# Patient Record
Sex: FEMALE | Race: BLACK OR AFRICAN AMERICAN | Employment: FULL TIME | URBAN - METROPOLITAN AREA
[De-identification: names, ages, dates, MRNs, and addresses within clinical notes are randomized per-mention and may not be internally consistent; named-entity substitution may affect disease eponyms.]

---

## 2021-06-03 ENCOUNTER — TRANSCRIBE ORDER (OUTPATIENT)
Dept: SCHEDULING | Age: 56
End: 2021-06-03

## 2021-06-03 DIAGNOSIS — R10.2 PERINEAL NEURALGIA: Primary | ICD-10-CM

## 2021-06-04 ENCOUNTER — TRANSCRIBE ORDER (OUTPATIENT)
Dept: SCHEDULING | Age: 56
End: 2021-06-04

## 2021-06-04 DIAGNOSIS — R10.2 PERINEAL NEURALGIA: Primary | ICD-10-CM

## 2021-06-14 ENCOUNTER — HOSPITAL ENCOUNTER (OUTPATIENT)
Dept: ULTRASOUND IMAGING | Age: 56
Discharge: HOME | End: 2021-06-14
Payer: COMMERCIAL

## 2021-06-14 DIAGNOSIS — R10.2 PERINEAL NEURALGIA: ICD-10-CM

## 2021-06-14 PROCEDURE — 76830 TRANSVAGINAL US NON-OB: CPT

## 2021-06-14 PROCEDURE — 76856 US EXAM PELVIC COMPLETE: CPT

## 2021-11-05 ENCOUNTER — HOSPITAL ENCOUNTER (EMERGENCY)
Age: 56
Discharge: HOME | End: 2021-11-05
Attending: EMERGENCY MEDICINE
Payer: COMMERCIAL

## 2021-11-05 VITALS
DIASTOLIC BLOOD PRESSURE: 103 MMHG | HEART RATE: 70 BPM | TEMPERATURE: 97.3 F | RESPIRATION RATE: 18 BRPM | OXYGEN SATURATION: 100 % | SYSTOLIC BLOOD PRESSURE: 211 MMHG

## 2021-11-05 DIAGNOSIS — K08.89 TOOTHACHE: Primary | ICD-10-CM

## 2021-11-05 PROCEDURE — 74011000250 HC RX REV CODE- 250: Performed by: EMERGENCY MEDICINE

## 2021-11-05 PROCEDURE — 99282 EMERGENCY DEPT VISIT SF MDM: CPT

## 2021-11-05 PROCEDURE — 74011250637 HC RX REV CODE- 250/637: Performed by: EMERGENCY MEDICINE

## 2021-11-05 RX ORDER — PENICILLIN V POTASSIUM 500 MG/1
500 TABLET, FILM COATED ORAL 4 TIMES DAILY
Qty: 28 TABLET | Refills: 0 | Status: SHIPPED | OUTPATIENT
Start: 2021-11-05 | End: 2021-11-12

## 2021-11-05 RX ADMIN — BENZOCAINE: 200 SPRAY DENTAL; ORAL; PERIODONTAL at 04:50

## 2021-11-05 NOTE — ED TRIAGE NOTES
Pt arrives ambulatory with CC of dental pain on the right lower side of her mouth that extends down her neck, states it began a few days ago and the dentist is unable to get her in for an appointment for several days.

## 2021-11-05 NOTE — ED PROVIDER NOTES
Healthy. She presents with complaints of right lower molar pain. The pain began yesterday approximately 16 hours ago. It has been constant. She can still eat and drink but it is painful to chew on the right side. No limitation of jaw opening. She is having pain under her right mandible. No fever. No significant swelling. Symptoms are moderate. She has tried [place] plus with some relief. No past medical history on file. No past surgical history on file. No family history on file. Social History     Socioeconomic History    Marital status: SINGLE     Spouse name: Not on file    Number of children: Not on file    Years of education: Not on file    Highest education level: Not on file   Occupational History    Not on file   Tobacco Use    Smoking status: Not on file    Smokeless tobacco: Not on file   Substance and Sexual Activity    Alcohol use: Not on file    Drug use: Not on file    Sexual activity: Not on file   Other Topics Concern    Not on file   Social History Narrative    Not on file     Social Determinants of Health     Financial Resource Strain:     Difficulty of Paying Living Expenses: Not on file   Food Insecurity:     Worried About Running Out of Food in the Last Year: Not on file    [place] of Food in the Last Year: Not on file   Transportation Needs:     Lack of Transportation (Medical): Not on file    Lack of Transportation (Non-Medical):  Not on file   Physical Activity:     Days of Exercise per Week: Not on file    Minutes of Exercise per Session: Not on file   Stress:     Feeling of Stress : Not on file   Social Connections:     Frequency of Communication with Friends and Family: Not on file    Frequency of Social Gatherings with Friends and Family: Not on file    Attends Catholic Services: Not on file    Active Member of Clubs or Organizations: Not on file    Attends Club or Organization Meetings: Not on file    Marital Status: Not on file   Intimate Partner Violence:     Fear of Current or Ex-Partner: Not on file    Emotionally Abused: Not on file    Physically Abused: Not on file    Sexually Abused: Not on file   Housing Stability:     Unable to Pay for Housing in the Last Year: Not on file    Number of [place] in the Last Year: Not on file    Unstable Housing in the Last Year: Not on file         ALLERGIES: Patient has no known allergies. Review of Systems   All other systems reviewed and are negative. Vitals:    11/05/21 0420   BP: (!) 211/103   Pulse: 70   Resp: 18   Temp: 97.3 °F (36.3 °C)   SpO2: 100%            Physical Exam  Vitals and nursing note reviewed. Constitutional:       Appearance: She is well-developed. HENT:      Head: Normocephalic and atraumatic. Mouth/Throat:      Comments: Tender right lower molar with erosion of the tooth noted. No abscess noted. No trismus. Eyes:      Conjunctiva/sclera: Conjunctivae normal.   Neck:      Trachea: No tracheal deviation. Comments: She has some anterior neck tenderness under her right mandible. No significant swelling. Cardiovascular:      Rate and Rhythm: Normal rate. Pulmonary:      Effort: Pulmonary effort is normal.   Abdominal:      General: There is no distension. Skin:     General: Skin is dry. Neurological:      Mental Status: She is alert. MDM       Procedures    Assessment/plan: Right lower molar tooth ache -no abscess noted. Nontoxic appearance. No trismus or voice changes. Dental ball in the ED and penicillin for home. Close dental follow-up. Return precautions discussed.   Godfrey Jesus MD  4:35 AM

## 2022-03-03 ENCOUNTER — TRANSCRIBE ORDER (OUTPATIENT)
Dept: SCHEDULING | Age: 57
End: 2022-03-03

## 2022-03-03 DIAGNOSIS — Z12.31 SCREENING MAMMOGRAM, ENCOUNTER FOR: Primary | ICD-10-CM

## 2022-03-18 LAB
CREATININE, EXTERNAL: 0.87
LDL-C, EXTERNAL: 91
TOTAL CHOLESTEROL, NCHOLT: 163

## 2022-10-17 RX ORDER — BISOPROLOL FUMARATE AND HYDROCHLOROTHIAZIDE 5; 6.25 MG/1; MG/1
1 TABLET ORAL DAILY
Qty: 30 TABLET | Refills: 0 | Status: SHIPPED | OUTPATIENT
Start: 2022-10-17 | End: 2022-12-05 | Stop reason: SDUPTHER

## 2022-10-17 NOTE — TELEPHONE ENCOUNTER
Patient called stating she needs a refill of Bisoprolol Hydroch 5-6.25mg 1 daily to [facility] mail order

## 2022-11-25 DIAGNOSIS — I10 ESSENTIAL HYPERTENSION: Primary | ICD-10-CM

## 2022-11-25 DIAGNOSIS — E78.2 MIXED HYPERLIPIDEMIA: ICD-10-CM

## 2022-12-05 RX ORDER — BISOPROLOL FUMARATE AND HYDROCHLOROTHIAZIDE 5; 6.25 MG/1; MG/1
1 TABLET ORAL DAILY
Qty: 30 TABLET | Refills: 0 | Status: SHIPPED | OUTPATIENT
Start: 2022-12-05

## 2022-12-05 NOTE — TELEPHONE ENCOUNTER
Patient needs refill on Bisoprolol Hydrochlorothiazide.    [id number] [facility] ([place], [place])

## 2022-12-28 PROBLEM — E78.00 HYPERCHOLESTEROLEMIA: Status: ACTIVE | Noted: 2022-12-28

## 2022-12-28 PROBLEM — I10 BENIGN DIASTOLIC HYPERTENSION: Status: ACTIVE | Noted: 2022-12-28

## 2022-12-28 PROBLEM — E03.8 SUBCLINICAL HYPOTHYROIDISM: Status: ACTIVE | Noted: 2022-12-28

## 2022-12-28 PROBLEM — E55.9 VITAMIN D DEFICIENCY: Status: ACTIVE | Noted: 2022-12-28

## 2022-12-28 PROBLEM — D25.9 UTERINE LEIOMYOMA: Status: ACTIVE | Noted: 2022-12-28

## 2023-02-03 ENCOUNTER — TRANSCRIBE ORDER (OUTPATIENT)
Dept: SCHEDULING | Age: 58
End: 2023-02-03

## 2023-02-03 DIAGNOSIS — Z12.31 ENCOUNTER FOR SCREENING MAMMOGRAM FOR MALIGNANT NEOPLASM OF BREAST: Primary | ICD-10-CM

## 2023-04-22 DIAGNOSIS — Z12.31 ENCOUNTER FOR SCREENING MAMMOGRAM FOR MALIGNANT NEOPLASM OF BREAST: Primary | ICD-10-CM

## 2023-12-22 ENCOUNTER — HOSPITAL ENCOUNTER (OUTPATIENT)
Facility: HOSPITAL | Age: 58
Setting detail: OBSERVATION
Discharge: HOME | End: 2023-12-24
Attending: EMERGENCY MEDICINE | Admitting: HOSPITALIST
Payer: MEDICAID

## 2023-12-22 ENCOUNTER — APPOINTMENT (OUTPATIENT)
Facility: HOSPITAL | Age: 58
End: 2023-12-22
Payer: MEDICAID

## 2023-12-22 DIAGNOSIS — R07.89 OTHER CHEST PAIN: ICD-10-CM

## 2023-12-22 DIAGNOSIS — R07.9 CHEST PAIN, UNSPECIFIED TYPE: Primary | ICD-10-CM

## 2023-12-22 LAB
ALBUMIN SERPL-MCNC: 3.9 G/DL (ref 3.5–5)
ALBUMIN/GLOB SERPL: 0.9 (ref 1.1–2.2)
ALP SERPL-CCNC: 107 U/L (ref 45–117)
ALT SERPL-CCNC: 16 U/L (ref 12–78)
ANION GAP SERPL CALC-SCNC: 5 MMOL/L (ref 5–15)
AST SERPL W P-5'-P-CCNC: 13 U/L (ref 15–37)
BASOPHILS # BLD: 0 K/UL (ref 0–0.1)
BASOPHILS NFR BLD: 0 % (ref 0–1)
BILIRUB SERPL-MCNC: 0.7 MG/DL (ref 0.2–1)
BNP SERPL-MCNC: 104 PG/ML
BUN SERPL-MCNC: 9 MG/DL (ref 6–20)
BUN/CREAT SERPL: 11 (ref 12–20)
CA-I BLD-MCNC: 9.5 MG/DL (ref 8.5–10.1)
CHLORIDE SERPL-SCNC: 108 MMOL/L (ref 97–108)
CO2 SERPL-SCNC: 28 MMOL/L (ref 21–32)
CREAT SERPL-MCNC: 0.81 MG/DL (ref 0.55–1.02)
D DIMER PPP FEU-MCNC: 0.57 UG/ML(FEU)
DIFFERENTIAL METHOD BLD: NORMAL
EOSINOPHIL # BLD: 0.1 K/UL (ref 0–0.4)
EOSINOPHIL NFR BLD: 1 % (ref 0–7)
ERYTHROCYTE [DISTWIDTH] IN BLOOD BY AUTOMATED COUNT: 13.5 % (ref 11.5–14.5)
GLOBULIN SER CALC-MCNC: 4.5 G/DL (ref 2–4)
GLUCOSE SERPL-MCNC: 100 MG/DL (ref 65–100)
HCT VFR BLD AUTO: 37.3 % (ref 35–47)
HGB BLD-MCNC: 11.9 G/DL (ref 11.5–16)
IMM GRANULOCYTES # BLD AUTO: 0 K/UL (ref 0–0.04)
IMM GRANULOCYTES NFR BLD AUTO: 0 % (ref 0–0.5)
LIPASE SERPL-CCNC: 54 U/L (ref 13–75)
LYMPHOCYTES # BLD: 1.8 K/UL (ref 0.8–3.5)
LYMPHOCYTES NFR BLD: 34 % (ref 12–49)
MAGNESIUM SERPL-MCNC: 2.3 MG/DL (ref 1.6–2.4)
MCH RBC QN AUTO: 26.2 PG (ref 26–34)
MCHC RBC AUTO-ENTMCNC: 31.9 G/DL (ref 30–36.5)
MCV RBC AUTO: 82.2 FL (ref 80–99)
MONOCYTES # BLD: 0.3 K/UL (ref 0–1)
MONOCYTES NFR BLD: 6 % (ref 5–13)
NEUTS SEG # BLD: 3.1 K/UL (ref 1.8–8)
NEUTS SEG NFR BLD: 59 % (ref 32–75)
NRBC # BLD: 0 K/UL (ref 0–0.01)
NRBC BLD-RTO: 0 PER 100 WBC
PLATELET # BLD AUTO: 301 K/UL (ref 150–400)
PMV BLD AUTO: 11 FL (ref 8.9–12.9)
POTASSIUM SERPL-SCNC: 3.4 MMOL/L (ref 3.5–5.1)
PROT SERPL-MCNC: 8.4 G/DL (ref 6.4–8.2)
RBC # BLD AUTO: 4.54 M/UL (ref 3.8–5.2)
SODIUM SERPL-SCNC: 141 MMOL/L (ref 136–145)
TROPONIN I SERPL HS-MCNC: 7 NG/L (ref 0–51)
TROPONIN I SERPL HS-MCNC: 7 NG/L (ref 0–51)
WBC # BLD AUTO: 5.3 K/UL (ref 3.6–11)

## 2023-12-22 PROCEDURE — 71275 CT ANGIOGRAPHY CHEST: CPT

## 2023-12-22 PROCEDURE — 85025 COMPLETE CBC W/AUTO DIFF WBC: CPT

## 2023-12-22 PROCEDURE — 83735 ASSAY OF MAGNESIUM: CPT

## 2023-12-22 PROCEDURE — 36415 COLL VENOUS BLD VENIPUNCTURE: CPT

## 2023-12-22 PROCEDURE — 6360000004 HC RX CONTRAST MEDICATION: Performed by: EMERGENCY MEDICINE

## 2023-12-22 PROCEDURE — 80053 COMPREHEN METABOLIC PANEL: CPT

## 2023-12-22 PROCEDURE — 94761 N-INVAS EAR/PLS OXIMETRY MLT: CPT

## 2023-12-22 PROCEDURE — 93005 ELECTROCARDIOGRAM TRACING: CPT | Performed by: EMERGENCY MEDICINE

## 2023-12-22 PROCEDURE — 85379 FIBRIN DEGRADATION QUANT: CPT

## 2023-12-22 PROCEDURE — 99285 EMERGENCY DEPT VISIT HI MDM: CPT

## 2023-12-22 PROCEDURE — 83880 ASSAY OF NATRIURETIC PEPTIDE: CPT

## 2023-12-22 PROCEDURE — 71045 X-RAY EXAM CHEST 1 VIEW: CPT

## 2023-12-22 PROCEDURE — 84484 ASSAY OF TROPONIN QUANT: CPT

## 2023-12-22 PROCEDURE — 83690 ASSAY OF LIPASE: CPT

## 2023-12-22 RX ADMIN — IOPAMIDOL 100 ML: 755 INJECTION, SOLUTION INTRAVENOUS at 23:23

## 2023-12-23 LAB
ANION GAP SERPL CALC-SCNC: 5 MMOL/L (ref 5–15)
BUN SERPL-MCNC: 8 MG/DL (ref 6–20)
BUN/CREAT SERPL: 9 (ref 12–20)
CA-I BLD-MCNC: 9 MG/DL (ref 8.5–10.1)
CHLORIDE SERPL-SCNC: 107 MMOL/L (ref 97–108)
CHOLEST SERPL-MCNC: 244 MG/DL
CO2 SERPL-SCNC: 29 MMOL/L (ref 21–32)
CREAT SERPL-MCNC: 0.87 MG/DL (ref 0.55–1.02)
ERYTHROCYTE [DISTWIDTH] IN BLOOD BY AUTOMATED COUNT: 13.4 % (ref 11.5–14.5)
GLUCOSE SERPL-MCNC: 116 MG/DL (ref 65–100)
HCT VFR BLD AUTO: 37.2 % (ref 35–47)
HDLC SERPL-MCNC: 48 MG/DL
HDLC SERPL: 5.1 (ref 0–5)
HGB BLD-MCNC: 11.7 G/DL (ref 11.5–16)
LDLC SERPL CALC-MCNC: 170.6 MG/DL (ref 0–100)
LIPID PANEL: ABNORMAL
MCH RBC QN AUTO: 25.9 PG (ref 26–34)
MCHC RBC AUTO-ENTMCNC: 31.5 G/DL (ref 30–36.5)
MCV RBC AUTO: 82.3 FL (ref 80–99)
NRBC # BLD: 0 K/UL (ref 0–0.01)
NRBC BLD-RTO: 0 PER 100 WBC
PLATELET # BLD AUTO: 257 K/UL (ref 150–400)
PMV BLD AUTO: 10.6 FL (ref 8.9–12.9)
POTASSIUM SERPL-SCNC: 3.5 MMOL/L (ref 3.5–5.1)
RBC # BLD AUTO: 4.52 M/UL (ref 3.8–5.2)
SODIUM SERPL-SCNC: 141 MMOL/L (ref 136–145)
TRIGL SERPL-MCNC: 127 MG/DL
TROPONIN I SERPL HS-MCNC: 6 NG/L (ref 0–51)
VLDLC SERPL CALC-MCNC: 25.4 MG/DL
WBC # BLD AUTO: 5.2 K/UL (ref 3.6–11)

## 2023-12-23 PROCEDURE — 99254 IP/OBS CNSLTJ NEW/EST MOD 60: CPT | Performed by: SURGERY

## 2023-12-23 PROCEDURE — 2580000003 HC RX 258: Performed by: HOSPITALIST

## 2023-12-23 PROCEDURE — 6360000002 HC RX W HCPCS: Performed by: HOSPITALIST

## 2023-12-23 PROCEDURE — 6370000000 HC RX 637 (ALT 250 FOR IP): Performed by: STUDENT IN AN ORGANIZED HEALTH CARE EDUCATION/TRAINING PROGRAM

## 2023-12-23 PROCEDURE — 94761 N-INVAS EAR/PLS OXIMETRY MLT: CPT

## 2023-12-23 PROCEDURE — 80061 LIPID PANEL: CPT

## 2023-12-23 PROCEDURE — 80048 BASIC METABOLIC PNL TOTAL CA: CPT

## 2023-12-23 PROCEDURE — 36415 COLL VENOUS BLD VENIPUNCTURE: CPT

## 2023-12-23 PROCEDURE — 2580000003 HC RX 258: Performed by: STUDENT IN AN ORGANIZED HEALTH CARE EDUCATION/TRAINING PROGRAM

## 2023-12-23 PROCEDURE — G0378 HOSPITAL OBSERVATION PER HR: HCPCS

## 2023-12-23 PROCEDURE — 84484 ASSAY OF TROPONIN QUANT: CPT

## 2023-12-23 PROCEDURE — 6370000000 HC RX 637 (ALT 250 FOR IP): Performed by: HOSPITALIST

## 2023-12-23 PROCEDURE — 85027 COMPLETE CBC AUTOMATED: CPT

## 2023-12-23 RX ORDER — ONDANSETRON 4 MG/1
4 TABLET, ORALLY DISINTEGRATING ORAL EVERY 8 HOURS PRN
Status: DISCONTINUED | OUTPATIENT
Start: 2023-12-23 | End: 2023-12-24 | Stop reason: HOSPADM

## 2023-12-23 RX ORDER — LOPERAMIDE HYDROCHLORIDE 2 MG/1
2 CAPSULE ORAL 4 TIMES DAILY PRN
Status: DISCONTINUED | OUTPATIENT
Start: 2023-12-23 | End: 2023-12-24 | Stop reason: HOSPADM

## 2023-12-23 RX ORDER — SODIUM CHLORIDE 9 MG/ML
INJECTION, SOLUTION INTRAVENOUS CONTINUOUS
Status: DISPENSED | OUTPATIENT
Start: 2023-12-23 | End: 2023-12-24

## 2023-12-23 RX ORDER — ONDANSETRON 2 MG/ML
4 INJECTION INTRAMUSCULAR; INTRAVENOUS EVERY 6 HOURS PRN
Status: DISCONTINUED | OUTPATIENT
Start: 2023-12-23 | End: 2023-12-24 | Stop reason: HOSPADM

## 2023-12-23 RX ORDER — SODIUM CHLORIDE 9 MG/ML
INJECTION, SOLUTION INTRAVENOUS PRN
Status: DISCONTINUED | OUTPATIENT
Start: 2023-12-23 | End: 2023-12-24 | Stop reason: HOSPADM

## 2023-12-23 RX ORDER — LANOLIN ALCOHOL/MO/W.PET/CERES
1000 CREAM (GRAM) TOPICAL DAILY
Status: DISCONTINUED | OUTPATIENT
Start: 2023-12-23 | End: 2023-12-24 | Stop reason: HOSPADM

## 2023-12-23 RX ORDER — SODIUM CHLORIDE 0.9 % (FLUSH) 0.9 %
5-40 SYRINGE (ML) INJECTION PRN
Status: DISCONTINUED | OUTPATIENT
Start: 2023-12-23 | End: 2023-12-24 | Stop reason: HOSPADM

## 2023-12-23 RX ORDER — ACETAMINOPHEN 650 MG/1
650 SUPPOSITORY RECTAL EVERY 6 HOURS PRN
Status: DISCONTINUED | OUTPATIENT
Start: 2023-12-23 | End: 2023-12-24 | Stop reason: HOSPADM

## 2023-12-23 RX ORDER — ENOXAPARIN SODIUM 100 MG/ML
40 INJECTION SUBCUTANEOUS DAILY
Status: DISCONTINUED | OUTPATIENT
Start: 2023-12-23 | End: 2023-12-24 | Stop reason: HOSPADM

## 2023-12-23 RX ORDER — SODIUM CHLORIDE 0.9 % (FLUSH) 0.9 %
5-40 SYRINGE (ML) INJECTION EVERY 12 HOURS SCHEDULED
Status: DISCONTINUED | OUTPATIENT
Start: 2023-12-23 | End: 2023-12-24 | Stop reason: HOSPADM

## 2023-12-23 RX ORDER — BISOPROLOL FUMARATE AND HYDROCHLOROTHIAZIDE 5; 6.25 MG/1; MG/1
1 TABLET ORAL DAILY
Status: DISCONTINUED | OUTPATIENT
Start: 2023-12-23 | End: 2023-12-23

## 2023-12-23 RX ORDER — ASPIRIN 81 MG/1
81 TABLET, CHEWABLE ORAL DAILY
Status: DISCONTINUED | OUTPATIENT
Start: 2023-12-23 | End: 2023-12-24 | Stop reason: HOSPADM

## 2023-12-23 RX ORDER — ATORVASTATIN CALCIUM 10 MG/1
10 TABLET, FILM COATED ORAL DAILY
Status: DISCONTINUED | OUTPATIENT
Start: 2023-12-23 | End: 2023-12-24

## 2023-12-23 RX ORDER — ACETAMINOPHEN 325 MG/1
650 TABLET ORAL EVERY 6 HOURS PRN
Status: DISCONTINUED | OUTPATIENT
Start: 2023-12-23 | End: 2023-12-24 | Stop reason: HOSPADM

## 2023-12-23 RX ADMIN — SODIUM CHLORIDE, PRESERVATIVE FREE 10 ML: 5 INJECTION INTRAVENOUS at 21:36

## 2023-12-23 RX ADMIN — SODIUM CHLORIDE, PRESERVATIVE FREE 10 ML: 5 INJECTION INTRAVENOUS at 08:56

## 2023-12-23 RX ADMIN — LOPERAMIDE HYDROCHLORIDE 2 MG: 2 CAPSULE ORAL at 15:43

## 2023-12-23 RX ADMIN — SODIUM CHLORIDE: 9 INJECTION, SOLUTION INTRAVENOUS at 15:43

## 2023-12-23 RX ADMIN — SODIUM CHLORIDE, PRESERVATIVE FREE 10 ML: 5 INJECTION INTRAVENOUS at 09:28

## 2023-12-23 NOTE — ED NOTES
Patient is being transferred to [address] [place] [address] for further evaluation and treatment. Tele box placed on patient. Sbar completed on [facility]. Patient left on wheelchair stable.

## 2023-12-23 NOTE — ED NOTES
ED TO INPATIENT SBAR HANDOFF    Patient Name: Peggy Ty   Preferred Name: Crescencio Quiroz  : 1965  60 y.o. Family/Caregiver Present: no   Code Status Order: Full Code  PO Status: No  Telemetry Order: Yes  C-SSRS: Risk of Suicide: No Risk  Sitter no   Restraints:     Sepsis Risk Score Sepsis Risk Score: 0.54    Situation  Chief Complaint   Patient presents with    Chest Pain     Brief Description of Patient's Condition:   Mental Status: oriented, alert, coherent, and logical  Arrived from:Home  Imaging:   CTA CHEST W WO CONTRAST   Final Result   1. No evidence of pulmonary embolism. 2.  No other acute abnormalities. 3.  Large cyst in the spleen. XR CHEST PORTABLE   Final Result   No acute cardiopulmonary abnormalities.            Abnormal labs:   Abnormal Labs Reviewed   COMPREHENSIVE METABOLIC PANEL - Abnormal; Notable for the following components:       Result Value    Potassium 3.4 (*)     Bun/Cre Ratio 11 (*)     AST 13 (*)     Total Protein 8.4 (*)     Globulin 4.5 (*)     Albumin/Globulin Ratio 0.9 (*)     All other components within normal limits   D-DIMER, QUANTITATIVE - Abnormal; Notable for the following components:    D-Dimer, Quant 0.57 (*)     All other components within normal limits   BASIC METABOLIC PANEL W/ REFLEX TO MG FOR LOW K - Abnormal; Notable for the following components:    Glucose 116 (*)     Bun/Cre Ratio 9 (*)     All other components within normal limits   CBC - Abnormal; Notable for the following components:    MCH 25.9 (*)     All other components within normal limits       Background  Allergies: No Known Allergies  History:   Past Medical History:   Diagnosis Date    Benign diastolic hypertension     Hypercholesterolemia     Subclinical hypothyroidism     Uterine leiomyoma     Vitamin D deficiency        Assessment  Vitals:    Level of Consciousness: Alert (0)   Vitals:    23 0330 23 0415 23 0737 23 1151   BP: (!) 146/89 134/78 132/74

## 2023-12-23 NOTE — ED PROVIDER NOTES
11.9 11.5 - 16.0 g/dL    Hematocrit 37.3 35.0 - 47.0 %    MCV 82.2 80.0 - 99.0 FL    MCH 26.2 26.0 - 34.0 PG    MCHC 31.9 30.0 - 36.5 g/dL    RDW 13.5 11.5 - 14.5 %    Platelets 294 355 - 798 K/uL    MPV 11.0 8.9 - 12.9 FL    Nucleated RBCs 0.0 0.0  WBC    nRBC 0.00 0.00 - 0.01 K/uL    Neutrophils % 59 32 - 75 %    Lymphocytes % 34 12 - 49 %    Monocytes % 6 5 - 13 %    Eosinophils % 1 0 - 7 %    Basophils % 0 0 - 1 %    Immature Granulocytes 0 0 - 0.5 %    Neutrophils Absolute 3.1 1.8 - 8.0 K/UL    Lymphocytes Absolute 1.8 0.8 - 3.5 K/UL    Monocytes Absolute 0.3 0.0 - 1.0 K/UL    Eosinophils Absolute 0.1 0.0 - 0.4 K/UL    Basophils Absolute 0.0 0.0 - 0.1 K/UL    Absolute Immature Granulocyte 0.0 0.00 - 0.04 K/UL    Differential Type AUTOMATED     Comprehensive Metabolic Panel    Collection Time: 12/22/23  9:51 PM   Result Value Ref Range    Sodium 141 136 - 145 mmol/L    Potassium 3.4 (L) 3.5 - 5.1 mmol/L    Chloride 108 97 - 108 mmol/L    CO2 28 21 - 32 mmol/L    Anion Gap 5 5 - 15 mmol/L    Glucose 100 65 - 100 mg/dL    BUN 9 6 - 20 mg/dL    Creatinine 0.81 0.55 - 1.02 mg/dL    Bun/Cre Ratio 11 (L) 12 - 20      Est, Glom Filt Rate >60 >60 ml/min/1.73m2    Calcium 9.5 8.5 - 10.1 mg/dL    Total Bilirubin 0.7 0.2 - 1.0 mg/dL    AST 13 (L) 15 - 37 U/L    ALT 16 12 - 78 U/L    Alk Phosphatase 107 45 - 117 U/L    Total Protein 8.4 (H) 6.4 - 8.2 g/dL    Albumin 3.9 3.5 - 5.0 g/dL    Globulin 4.5 (H) 2.0 - 4.0 g/dL    Albumin/Globulin Ratio 0.9 (L) 1.1 - 2.2     Troponin    Collection Time: 12/22/23  9:51 PM   Result Value Ref Range    Troponin, High Sensitivity 7 0 - 51 ng/L   Lipase    Collection Time: 12/22/23  9:51 PM   Result Value Ref Range    Lipase 54 13 - 75 U/L   Brain Natriuretic Peptide    Collection Time: 12/22/23  9:51 PM   Result Value Ref Range    NT Pro- <125 pg/mL   D-Dimer, Quantitative    Collection Time: 12/22/23  9:51 PM   Result Value Ref Range    D-Dimer, Quant 0.57 (H) <0.50

## 2023-12-23 NOTE — H&P
High Sensitivity 7 0 - 51 ng/L   Lipase    Collection Time: 12/22/23  9:51 PM   Result Value Ref Range    Lipase 54 13 - 75 U/L   Brain Natriuretic Peptide    Collection Time: 12/22/23  9:51 PM   Result Value Ref Range    NT Pro- <125 pg/mL   D-Dimer, Quantitative    Collection Time: 12/22/23  9:51 PM   Result Value Ref Range    D-Dimer, Quant 0.57 (H) <0.50 ug/ml(FEU)   Magnesium    Collection Time: 12/22/23  9:51 PM   Result Value Ref Range    Magnesium 2.3 1.6 - 2.4 mg/dL   Troponin    Collection Time: 12/22/23 11:03 PM   Result Value Ref Range    Troponin, High Sensitivity 7 0 - 51 ng/L       Radiologic Studies :   Imaging:   CTA CHEST W WO CONTRAST   Final Result   1. No evidence of pulmonary embolism. 2.  No other acute abnormalities. 3.  Large cyst in the spleen. XR CHEST PORTABLE   Final Result   No acute cardiopulmonary abnormalities. Assessment and Plan :     Chest pain: Has family history. No other risk factors. Initial enzymes negative, will repeat in the morning, echocardiogram and cardiology consultation. Patient is explained about this and she understands. Medications Home :    Reviewed    Code status : Full code    VTE prophylaxis :  Enoxaparin    Advance Medical directive : Health care decision maker information is NOT on file. Discussion/MDM:   I have discussed patient's presentation/findings and clinical course to date with ED provider. Given the patient's current clinical presentation, I have a high level of concern for decompensation if discharged from the emergency department as patient has multiple medical comorbidities with increased risk of morbidity and mortality  that warrants admission to hospital.     I have reviewed patient's presenting subjective and objective findings, as well as all laboratory studies, imaging studies, and vital signs to date as well as treatment rendered and patient's response to those treatments.   In

## 2023-12-23 NOTE — ED NOTES
Patient refused all meds due this morning stating that she does not takes any medications at home and she does not wants to take any here. Provider (Kemi Watkins) came in the room and was made aware of this. Patient denies any pain or discomfort. VSS. WCTM.

## 2023-12-24 VITALS
OXYGEN SATURATION: 100 % | BODY MASS INDEX: 24.41 KG/M2 | RESPIRATION RATE: 18 BRPM | WEIGHT: 143 LBS | TEMPERATURE: 98.2 F | HEIGHT: 64 IN | SYSTOLIC BLOOD PRESSURE: 165 MMHG | HEART RATE: 71 BPM | DIASTOLIC BLOOD PRESSURE: 97 MMHG

## 2023-12-24 PROBLEM — E78.00 HYPERCHOLESTEROLEMIA: Status: ACTIVE | Noted: 2022-12-28

## 2023-12-24 LAB
EKG ATRIAL RATE: 91 BPM
EKG DIAGNOSIS: NORMAL
EKG P AXIS: 70 DEGREES
EKG P-R INTERVAL: 184 MS
EKG Q-T INTERVAL: 384 MS
EKG QRS DURATION: 80 MS
EKG QTC CALCULATION (BAZETT): 472 MS
EKG R AXIS: 8 DEGREES
EKG T AXIS: 50 DEGREES
EKG VENTRICULAR RATE: 91 BPM

## 2023-12-24 PROCEDURE — 87506 IADNA-DNA/RNA PROBE TQ 6-11: CPT

## 2023-12-24 PROCEDURE — 2580000003 HC RX 258: Performed by: STUDENT IN AN ORGANIZED HEALTH CARE EDUCATION/TRAINING PROGRAM

## 2023-12-24 PROCEDURE — 2580000003 HC RX 258: Performed by: HOSPITALIST

## 2023-12-24 PROCEDURE — 87177 OVA AND PARASITES SMEARS: CPT

## 2023-12-24 PROCEDURE — 6370000000 HC RX 637 (ALT 250 FOR IP): Performed by: STUDENT IN AN ORGANIZED HEALTH CARE EDUCATION/TRAINING PROGRAM

## 2023-12-24 PROCEDURE — 87209 SMEAR COMPLEX STAIN: CPT

## 2023-12-24 PROCEDURE — G0378 HOSPITAL OBSERVATION PER HR: HCPCS

## 2023-12-24 PROCEDURE — 87329 GIARDIA AG IA: CPT

## 2023-12-24 RX ORDER — PANTOPRAZOLE SODIUM 40 MG/1
40 TABLET, DELAYED RELEASE ORAL
Status: DISCONTINUED | OUTPATIENT
Start: 2023-12-25 | End: 2023-12-24 | Stop reason: HOSPADM

## 2023-12-24 RX ORDER — ATORVASTATIN CALCIUM 20 MG/1
20 TABLET, FILM COATED ORAL DAILY
Qty: 30 TABLET | Refills: 3 | Status: SHIPPED | OUTPATIENT
Start: 2023-12-25

## 2023-12-24 RX ORDER — ASPIRIN 81 MG/1
81 TABLET, CHEWABLE ORAL DAILY
Qty: 30 TABLET | Refills: 3 | Status: SHIPPED | OUTPATIENT
Start: 2023-12-25

## 2023-12-24 RX ORDER — SODIUM CHLORIDE 9 MG/ML
INJECTION, SOLUTION INTRAVENOUS CONTINUOUS
Status: DISCONTINUED | OUTPATIENT
Start: 2023-12-24 | End: 2023-12-24 | Stop reason: HOSPADM

## 2023-12-24 RX ORDER — PANTOPRAZOLE SODIUM 40 MG/1
40 TABLET, DELAYED RELEASE ORAL
Qty: 30 TABLET | Refills: 3 | Status: SHIPPED | OUTPATIENT
Start: 2023-12-25

## 2023-12-24 RX ORDER — ATORVASTATIN CALCIUM 20 MG/1
20 TABLET, FILM COATED ORAL DAILY
Status: DISCONTINUED | OUTPATIENT
Start: 2023-12-24 | End: 2023-12-24 | Stop reason: HOSPADM

## 2023-12-24 RX ADMIN — SODIUM CHLORIDE, PRESERVATIVE FREE 10 ML: 5 INJECTION INTRAVENOUS at 09:31

## 2023-12-24 RX ADMIN — ATORVASTATIN CALCIUM 20 MG: 20 TABLET, FILM COATED ORAL at 12:10

## 2023-12-24 RX ADMIN — SODIUM CHLORIDE: 9 INJECTION, SOLUTION INTRAVENOUS at 14:24

## 2023-12-24 NOTE — CARE COORDINATION
12/24/23 1026   Service Assessment   Patient Orientation Alert and Oriented   Cognition Alert   History Provided By Patient   Primary Caregiver Self   Accompanied By/Relationship alone in room   [address] is: Legal Next of Kin   PCP Verified by CM Yes   Last Visit to PCP Within last 6 months   Prior Functional Level Independent in ADLs/IADLs   Current Functional Level Independent in ADLs/IADLs   Can patient return to prior living arrangement Yes   Ability to make needs known: Good   Family able to assist with home care needs: Yes   Would you like for me to discuss the discharge plan with any other family members/significant others, and if so, who? 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 No   Potential Assistance Purchasing Medications No   Type of Home Care Services None   Patient expects to be discharged to: House   History of falls? 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 No DME. HH, SNF or IRF, Patient uses [facility] for her scripts.      Advance Care Planning

## 2023-12-24 NOTE — DISCHARGE SUMMARY
Discharge Summary    Name: Tara Pennington  [id number]  YOB: 1965 (Age: 60 y.o.)   Date of Admission: 2023  Date of Discharge: 2023  Attending Physician: Randee Bridges MD    Discharge Diagnosis:   Principal Problem:    Chest pain  Active Problems:    Hypercholesterolemia  Resolved Problems:    * No resolved hospital problems. *       Consultations:  IP CONSULT TO CARDIOLOGY  IP CONSULT TO GENERAL SURGERY      Brief Admission History/Reason for Admission Per Nathaly Galindo MD:   Chest discomfort    Brief Hospital Course by Main Problems:   Tara Pennington is  60 y.o. female with no significant medical problems and not taking any medications who presented to the emergency room complaining of chest tightness. States she was having upper respiratory symptoms for last few days, that he started getting better. But she started having pain in her right shoulder which was severe, intermittent with no exacerbating relieving factors. Then developed retrosternal chest discomfort, worse with taking a breath. She denies any fever, chills, cough or trouble breathing at this time. Has family history of heart problems and brother  of chest pain with heart issue when he was very young. In the ED, vitals stable. Initial workup unremarkable. Negative cardiac enzymes. EKG NSR with no evidence of ischemia. CTA chest negative for pulmonary embolism or acute infiltrates, but does note large splenic abscess. Admitted for further management. Cardiology and General surgery consulted. No further work-up from either general surgery or cardiology perspective. Lipid panel showing hypercholesterolemia. Advised patient to restart Lipitor. Patient can follow-up with outpatient work-up per cardio, Dr. Olaf Tadeo. Medically stable for discharge. Discharge Exam:  Patient seen and examined by me on discharge day.   Pertinent Findings:  Patient Vitals for the past 24

## 2023-12-24 NOTE — CONSULTS
CARDIOLOGY CONSULTATION    REASON FOR CONSULT: chest pain    REQUESTING PROVIDER: Dr Geovany Llamas:  chest pain    HISTORY OF PRESENT ILLNESS:  Swapnil Joyce is a 60y.o. year-old female with past medical history significant for chest pain. No prior cardiac hx. Onset of chest pain Friday during hair appt - constant tightness, radiating to right shoulder, worse with deep breath, and recumbent position. ED evaluation significant for nonischemic EKG, normal troponin, NTproBNP. Chest CTA without PE but incidental CAC noted. Still has persistent mild chest tightness. Notes exertional component but with breathing, ie pleuritic nature. Also notes intermittent diarrhea for past two weeks. Records from hospital admission course thus far reviewed. Telemetry reviewed. No events overnight. .    INPATIENT MEDICATIONS:  Home medications reviewed.     Current Facility-Administered Medications:     atorvastatin (LIPITOR) tablet 20 mg, 20 mg, Oral, Daily, Constantino Butler PA-C, 20 mg at 12/24/23 1210    vitamin B-12 (CYANOCOBALAMIN) tablet 1,000 mcg, 1,000 mcg, Oral, Daily, Birdie Castillo MD    sodium chloride flush 0.9 % injection 5-40 mL, 5-40 mL, IntraVENous, 2 times per day, Birdie Castillo MD, 10 mL at 12/24/23 0931    sodium chloride flush 0.9 % injection 5-40 mL, 5-40 mL, IntraVENous, PRN, Birdie Castillo MD, 10 mL at 12/23/23 0856    0.9 % sodium chloride infusion, , IntraVENous, PRN, Birdie Castillo MD    ondansetron (ZOFRAN-ODT) disintegrating tablet 4 mg, 4 mg, Oral, Q8H PRN **OR** ondansetron (ZOFRAN) injection 4 mg, 4 mg, IntraVENous, Q6H PRN, Birdie Castillo MD    acetaminophen (TYLENOL) tablet 650 mg, 650 mg, Oral, Q6H PRN **OR** acetaminophen (TYLENOL) suppository 650 mg, 650 mg, Rectal, Q6H PRN, Birdie Castillo MD    enoxaparin (LOVENOX) injection 40 mg, 40 mg, SubCUTAneous, Daily, Birdie Castillo MD    aspirin chewable tablet
Gap 5 5 - 15 mmol/L    Glucose 116 (H) 65 - 100 mg/dL    BUN 8 6 - 20 mg/dL    Creatinine 0.87 0.55 - 1.02 mg/dL    Bun/Cre Ratio 9 (L) 12 - 20      Est, Glom Filt Rate >60 >60 ml/min/1.73m2    Calcium 9.0 8.5 - 10.1 mg/dL   CBC    Collection Time: 12/23/23  5:18 AM   Result Value Ref Range    WBC 5.2 3.6 - 11.0 K/uL    RBC 4.52 3.80 - 5.20 M/uL    Hemoglobin 11.7 11.5 - 16.0 g/dL    Hematocrit 37.2 35.0 - 47.0 %    MCV 82.3 80.0 - 99.0 FL    MCH 25.9 (L) 26.0 - 34.0 PG    MCHC 31.5 30.0 - 36.5 g/dL    RDW 13.4 11.5 - 14.5 %    Platelets 961 751 - 344 K/uL    MPV 10.6 8.9 - 12.9 FL    Nucleated RBCs 0.0 0.0  WBC    nRBC 0.00 0.00 - 0.01 K/uL        CTA CHEST W WO CONTRAST   Final Result   1. No evidence of pulmonary embolism. 2.  No other acute abnormalities. 3.  Large cyst in the spleen. XR CHEST PORTABLE   Final Result   No acute cardiopulmonary abnormalities. Assessment:     Hospital Problems             Last Modified POA    * (Principal) Chest pain 12/22/2023 Yes     Splenic cyst- likely pseudocyst based on appearance on imaging, asymptomatic    Plan:     Patient has been asymptomatic from this splenic cyst. Recommend observation at this point, pseudocysts are benign and resection is only indicated for symptomatic cysts and would require a splenectomy. Patient may follow up with me as needed if she develops symptoms from this cyst  Recommend stool studies or other evaluation for diarrhea  Further management per primary team    Signed By: Nick Saucedo, DO     December 23, 2023         Thank you for allowing me to participate in this patients care.

## 2023-12-24 NOTE — PLAN OF CARE
Problem: Discharge Planning  Goal: Discharge to home or other facility with appropriate resources  12/24/2023 1122 by Travis Parikh RN  Outcome: Progressing  12/24/2023 0524 by Gracia Harkins RN  Outcome: Progressing     Problem: Pain  Goal: Verbalizes/displays adequate comfort level or baseline comfort level  12/24/2023 1122 by Travis Parikh RN  Outcome: Progressing  12/24/2023 0524 by Gracia Harkins RN  Outcome: Progressing

## 2023-12-26 LAB
C COLI+JEJUNI TUF STL QL NAA+PROBE: NEGATIVE
EC STX1+STX2 GENES STL QL NAA+PROBE: NEGATIVE
ETEC ELTA+ESTB GENES STL QL NAA+PROBE: NEGATIVE
P SHIGELLOIDES DNA STL QL NAA+PROBE: NEGATIVE
SALMONELLA SP SPAO STL QL NAA+PROBE: NEGATIVE
SHIGELLA SP+EIEC IPAH STL QL NAA+PROBE: NEGATIVE
V CHOL+PARA+VUL DNA STL QL NAA+NON-PROBE: NEGATIVE
Y ENTEROCOL DNA STL QL NAA+NON-PROBE: NEGATIVE

## 2023-12-28 LAB
G LAMBLIA AG STL QL IA: NEGATIVE
O+P SPEC MICRO: NORMAL
O+P STL CONC: NORMAL
SPECIMEN SOURCE: NORMAL
SPECIMEN SOURCE: NORMAL

## 2024-07-12 ENCOUNTER — HOSPITAL ENCOUNTER (EMERGENCY)
Facility: HOSPITAL | Age: 59
Discharge: HOME | End: 2024-07-12
Payer: MEDICAID

## 2024-07-12 VITALS
HEIGHT: 65 IN | WEIGHT: 140 LBS | SYSTOLIC BLOOD PRESSURE: 156 MMHG | DIASTOLIC BLOOD PRESSURE: 98 MMHG | OXYGEN SATURATION: 100 % | RESPIRATION RATE: 20 BRPM | BODY MASS INDEX: 23.32 KG/M2 | HEART RATE: 72 BPM | TEMPERATURE: 98.4 F

## 2024-07-12 DIAGNOSIS — T63.441A BEE STING REACTION, ACCIDENTAL OR UNINTENTIONAL, INITIAL ENCOUNTER: Primary | ICD-10-CM

## 2024-07-12 PROCEDURE — 99283 EMERGENCY DEPT VISIT LOW MDM: CPT

## 2024-07-12 RX ORDER — DIPHENHYDRAMINE HCL 25 MG
25 CAPSULE ORAL EVERY 6 HOURS PRN
Qty: 20 CAPSULE | Refills: 0 | Status: SHIPPED | OUTPATIENT
Start: 2024-07-12

## 2024-07-12 ASSESSMENT — PAIN - FUNCTIONAL ASSESSMENT
PAIN_FUNCTIONAL_ASSESSMENT: NONE - DENIES PAIN
PAIN_FUNCTIONAL_ASSESSMENT: NONE - DENIES PAIN

## 2024-07-13 NOTE — ED PROVIDER NOTES
[facility] EMERGENCY DEPT  EMERGENCY DEPARTMENT HISTORY AND PHYSICAL EXAM      Date: 2024  Patient Name: Yeny Lake  MRN: [id number]  YOB: 1965  Date of evaluation: 2024  Provider: CHARLEEN Campbell NP   Note Started: 10:54 PM EDT 24    HISTORY OF PRESENT ILLNESS     Chief Complaint   Patient presents with    Insect Bite       History Provided By: Patient    HPI: Yeny Lake is a 58 y.o. female with a past medical history as noted below presents to the emergency room with cc of bee sting.  Patient states she was cleaning out her yard yesterday and came in contact with a beehive.  She states she sustained multiple bee stings to her right hand and forearm.  She denies any pain but states her hand and arm have continued to swell and itch. No fevers or any other sustained injuries.  She denies any trouble breathing or swallowing, no previous bee sting allergy.  She has not tried taking anything for her symptoms citing she was unsure what to do or take.  PAST MEDICAL HISTORY   Past Medical History:  Past Medical History:   Diagnosis Date    Benign diastolic hypertension     Hypercholesterolemia     Subclinical hypothyroidism     Uterine leiomyoma     Vitamin D deficiency        Past Surgical History:  Past Surgical History:   Procedure Laterality Date     SECTION      1985    CYST REMOVAL      right kidney       Family History:  Family History   Problem Relation Age of Onset    Colon Cancer Maternal Grandmother        Social History:  Social History     Tobacco Use    Smoking status: Never    Smokeless tobacco: Never   Substance Use Topics    Alcohol use: Never    Drug use: Never       Allergies:  No Known Allergies    PCP: Stephanie Hutchison APRN - NP    Current Meds:   No current facility-administered medications for this encounter.     Current Outpatient Medications   Medication Sig Dispense Refill    diphenhydrAMINE (BENADRYL ALLERGY) 25 MG capsule Take 1 capsule by mouth every 6

## 2024-07-13 NOTE — ED TRIAGE NOTES
Pt endorses she was stung by a bee yesterday on her right elbow and hand.  Reports itching and swelling.  No self treatment.